# Patient Record
(demographics unavailable — no encounter records)

---

## 2025-06-20 NOTE — PHYSICAL EXAM
[No Acute Distress] : no acute distress [Normal Sclera/Conjunctiva] : normal sclera/conjunctiva [Normal Outer Ear/Nose] : the outer ears and nose were normal in appearance [Normal Oropharynx] : the oropharynx was normal [Supple] : supple [No Respiratory Distress] : no respiratory distress  [No Accessory Muscle Use] : no accessory muscle use [Clear to Auscultation] : lungs were clear to auscultation bilaterally [Normal Rate] : normal rate  [Regular Rhythm] : with a regular rhythm [Normal S1, S2] : normal S1 and S2 [No Murmur] : no murmur heard [No Edema] : there was no peripheral edema [Soft] : abdomen soft [Non Tender] : non-tender [Non-distended] : non-distended [Normal Anterior Cervical Nodes] : no anterior cervical lymphadenopathy [No Joint Swelling] : no joint swelling [No Focal Deficits] : no focal deficits [Normal Affect] : the affect was normal [Normal Insight/Judgement] : insight and judgment were intact [de-identified] : L leg weaker [de-identified] : using walker

## 2025-06-20 NOTE — PHYSICAL EXAM
[No Acute Distress] : no acute distress [Normal Sclera/Conjunctiva] : normal sclera/conjunctiva [Normal Outer Ear/Nose] : the outer ears and nose were normal in appearance [Normal Oropharynx] : the oropharynx was normal [Supple] : supple [No Respiratory Distress] : no respiratory distress  [No Accessory Muscle Use] : no accessory muscle use [Clear to Auscultation] : lungs were clear to auscultation bilaterally [Normal Rate] : normal rate  [Regular Rhythm] : with a regular rhythm [Normal S1, S2] : normal S1 and S2 [No Murmur] : no murmur heard [No Edema] : there was no peripheral edema [Soft] : abdomen soft [Non Tender] : non-tender [Non-distended] : non-distended [Normal Anterior Cervical Nodes] : no anterior cervical lymphadenopathy [No Joint Swelling] : no joint swelling [No Focal Deficits] : no focal deficits [Normal Affect] : the affect was normal [Normal Insight/Judgement] : insight and judgment were intact [de-identified] : L leg weaker [de-identified] : using walker

## 2025-06-20 NOTE — HISTORY OF PRESENT ILLNESS
[FreeTextEntry1] : vision changes [de-identified] : 60M with PMH of DM, HTN, HLD, CK3, is here to establish care.  Glaucoma - had emergent R eye surgery due to elevated pressure last year. Not on any eye medications. Complains of ongoing worsening vision changes since the surgery. Vision is blurry, cloudy. Has floaters and black spots with L eye. No pain, discharge, or redness. Last saw optho last year.  L tibia fracture - had surgery in 3/2025 for it. Doing PT 2x/week. Has ortho apt in 2 months.  CKD3 - never saw nephrologist outpatient  HTN - not taking amlodipine because BP 90s/60s at home sometimes. Takes hydralazine 50mg PRN instead of TID. Also takes metoprolol 50mg PRN instead of BID.  HLD - taking rosuvastatin 10mg, ASA 81  DM - uses insulin sliding scale only. No oral agents

## 2025-06-20 NOTE — HEALTH RISK ASSESSMENT
[Fair] :  ~his/her~ mood as fair [Yes] : Yes [2 - 4 times a month (2 pts)] : 2-4 times a month (2 points) [1 or 2 (0 pts)] : 1 or 2 (0 points) [Never (0 pts)] : Never (0 points) [No] : In the past 12 months have you used drugs other than those required for medical reasons? No [One fall no injury in past year] : Patient reported one fall in the past year without injury [0] : 2) Feeling down, depressed, or hopeless: Not at all (0) [PHQ-2 Negative - No further assessment needed] : PHQ-2 Negative - No further assessment needed [Never] : Never [NO] : No [None] : None [# of Members in Household ___] :  household currently consist of [unfilled] member(s) [Unemployed] : unemployed [High School] : high school [] :  [Feels Safe at Home] : Feels safe at home [Fully functional (bathing, dressing, toileting, transferring, walking, feeding)] : Fully functional (bathing, dressing, toileting, transferring, walking, feeding) [Fully functional (using the telephone, shopping, preparing meals, housekeeping, doing laundry, using] : Fully functional and needs no help or supervision to perform IADLs (using the telephone, shopping, preparing meals, housekeeping, doing laundry, using transportation, managing medications and managing finances) [Reports changes in vision] : Reports changes in vision [Smoke Detector] : smoke detector [Carbon Monoxide Detector] : carbon monoxide detector [Safety elements used in home] : safety elements used in home [Seat Belt] :  uses seat belt [Sunscreen] : uses sunscreen [With Patient/Caregiver] : , with patient/caregiver [I will adhere to the patient's wishes.] : I will adhere to the patient's wishes. [de-identified] : no opiods [de-identified] : variety [de-identified] : physical therapy, walking [[id number]] : 0 [HIV test declined] : HIV test declined [Hepatitis C test declined] : Hepatitis C test declined [Change in mental status noted] : No change in mental status noted [Language] : denies difficulty with language [Behavior] : denies difficulty with behavior [Learning/Retaining New Information] : denies difficulty learning/retaining new information [Handling Complex Tasks] : denies difficulty handling complex tasks [Reasoning] : denies difficulty with reasoning [Spatial Ability and Orientation] : denies difficulty with spatial ability and orientation [Reports changes in hearing] : Reports no changes in hearing [Reports changes in dental health] : Reports no changes in dental health [ColonoscopyComments] : never had one [de-identified] : as above [AdvancecareDate] : 6/2025 [FreeTextEntry4] : point of contact would wife, Heaven Whittington, [phone]

## 2025-06-20 NOTE — HEALTH RISK ASSESSMENT
[Fair] :  ~his/her~ mood as fair [Yes] : Yes [2 - 4 times a month (2 pts)] : 2-4 times a month (2 points) [1 or 2 (0 pts)] : 1 or 2 (0 points) [Never (0 pts)] : Never (0 points) [No] : In the past 12 months have you used drugs other than those required for medical reasons? No [One fall no injury in past year] : Patient reported one fall in the past year without injury [0] : 2) Feeling down, depressed, or hopeless: Not at all (0) [PHQ-2 Negative - No further assessment needed] : PHQ-2 Negative - No further assessment needed [Never] : Never [NO] : No [None] : None [# of Members in Household ___] :  household currently consist of [unfilled] member(s) [Unemployed] : unemployed [High School] : high school [] :  [Feels Safe at Home] : Feels safe at home [Fully functional (bathing, dressing, toileting, transferring, walking, feeding)] : Fully functional (bathing, dressing, toileting, transferring, walking, feeding) [Fully functional (using the telephone, shopping, preparing meals, housekeeping, doing laundry, using] : Fully functional and needs no help or supervision to perform IADLs (using the telephone, shopping, preparing meals, housekeeping, doing laundry, using transportation, managing medications and managing finances) [Reports changes in vision] : Reports changes in vision [Smoke Detector] : smoke detector [Carbon Monoxide Detector] : carbon monoxide detector [Safety elements used in home] : safety elements used in home [Seat Belt] :  uses seat belt [Sunscreen] : uses sunscreen [With Patient/Caregiver] : , with patient/caregiver [I will adhere to the patient's wishes.] : I will adhere to the patient's wishes. [de-identified] : no opiods [de-identified] : physical therapy, walking [de-identified] : variety [[id number]] : 0 [HIV test declined] : HIV test declined [Hepatitis C test declined] : Hepatitis C test declined [Change in mental status noted] : No change in mental status noted [Language] : denies difficulty with language [Behavior] : denies difficulty with behavior [Learning/Retaining New Information] : denies difficulty learning/retaining new information [Handling Complex Tasks] : denies difficulty handling complex tasks [Reasoning] : denies difficulty with reasoning [Spatial Ability and Orientation] : denies difficulty with spatial ability and orientation [Reports changes in hearing] : Reports no changes in hearing [Reports changes in dental health] : Reports no changes in dental health [ColonoscopyComments] : never had one [de-identified] : as above [AdvancecareDate] : 6/2025 [FreeTextEntry4] : point of contact would wife, Heaven Whittington, [phone]

## 2025-06-20 NOTE — ASSESSMENT
[Vaccines Reviewed] : Immunizations reviewed today. Please see immunization details in the vaccine log within the immunization flowsheet.  [FreeTextEntry1] : 60M with PMH of DM, HTN, HLD, CK3, glaucoma, tibia fx, is here to establish care.  Glaucoma - Past history of R eye surgery  - C/o worsening blurry vision for past year - Refer to ophtho  L tibia fracture - Surgery in 3/2025 - Continue with PT - Following ortho, next apt in 2 months  HTN  - Not taking amlodipine because BP 90s/60s at home sometimes - Was taking hydralazine 50mg PRN instead of TID - Was taking metoprolol 50mg PRN instead of BID - Proceed with only amlodipine 5 - Stop hydralazine and metoprolol  CKD - Refer to nephrology  HLD - Continue rosuvastatin 10mg, ASA 81  DM  - Continue with insulin sliding scale - allergic to metformin - start jardiance 10 - Refer to endo - Order A1c  #Preventative - Refer to GI for colonoscopy (never had one) - unsure of vaccine hx will obtain records -  Educated about importance of healthy diet, physical activity (30 min 5 days a week moderate intensity exercises), proper sleep (8 hours of sleep), importance of screening test for early detection and treatment of cancer. Importance of immunizations including COVID-19 and seasonal flu vaccine in order to prevent or lessen disease.

## 2025-06-20 NOTE — END OF VISIT
[] : Resident [FreeTextEntry3] : I, Dr. Paulino Vargas, saw and evaluated this patient in the presence of the resident. I discussed the management with the resident. I reviewed the note and agree with the documented plan of care with the following confirmations/corrections/additions: None.

## 2025-06-20 NOTE — HISTORY OF PRESENT ILLNESS
[FreeTextEntry1] : vision changes [de-identified] : 60M with PMH of DM, HTN, HLD, CK3, is here to establish care.  Glaucoma - had emergent R eye surgery due to elevated pressure last year. Not on any eye medications. Complains of ongoing worsening vision changes since the surgery. Vision is blurry, cloudy. Has floaters and black spots with L eye. No pain, discharge, or redness. Last saw optho last year.  L tibia fracture - had surgery in 3/2025 for it. Doing PT 2x/week. Has ortho apt in 2 months.  CKD3 - never saw nephrologist outpatient  HTN - not taking amlodipine because BP 90s/60s at home sometimes. Takes hydralazine 50mg PRN instead of TID. Also takes metoprolol 50mg PRN instead of BID.  HLD - taking rosuvastatin 10mg, ASA 81  DM - uses insulin sliding scale only. No oral agents